# Patient Record
Sex: FEMALE | Race: WHITE | NOT HISPANIC OR LATINO
[De-identification: names, ages, dates, MRNs, and addresses within clinical notes are randomized per-mention and may not be internally consistent; named-entity substitution may affect disease eponyms.]

---

## 2023-02-14 PROBLEM — Z00.00 ENCOUNTER FOR PREVENTIVE HEALTH EXAMINATION: Status: ACTIVE | Noted: 2023-02-14

## 2023-03-21 ENCOUNTER — APPOINTMENT (OUTPATIENT)
Dept: NEPHROLOGY | Facility: CLINIC | Age: 48
End: 2023-03-21
Payer: COMMERCIAL

## 2023-03-21 VITALS
BODY MASS INDEX: 41.41 KG/M2 | TEMPERATURE: 98 F | WEIGHT: 233.69 LBS | OXYGEN SATURATION: 99 % | SYSTOLIC BLOOD PRESSURE: 116 MMHG | HEIGHT: 63 IN | DIASTOLIC BLOOD PRESSURE: 78 MMHG | HEART RATE: 88 BPM

## 2023-03-21 DIAGNOSIS — N02.8 RECURRENT AND PERSISTENT HEMATURIA WITH OTHER MORPHOLOGIC CHANGES: ICD-10-CM

## 2023-03-21 PROCEDURE — 99205 OFFICE O/P NEW HI 60 MIN: CPT

## 2023-03-21 RX ORDER — LISINOPRIL 2.5 MG/1
2.5 TABLET ORAL TWICE DAILY
Refills: 0 | Status: ACTIVE | COMMUNITY

## 2023-03-21 NOTE — HISTORY OF PRESENT ILLNESS
[FreeTextEntry1] : Consult for IgA nephropathy \par \par 46 yo very pleasant lady who was first diagnosed with IgA nephropathy 10 years ago at [facility]. She had presented with an elevated creatinine and protein in the urine. Kidney Biopsy ( actual path report not available)- showed IgA nephropathy. Per patient she was started on Valsartan 160 mg twice a day at that time. She apparently did not tolerate this dose. She started seeing a nephrologist at [facility] about 5 years ago who initiated her on Lisinopril 2.5 mg BID that she has continued to take since then. she has not seen a nephrologist since about a year ago. They live in [place], [place]. \par \par Her home BPs have been low 100s systolic \par \par never had gross blood in urine \par \par does not take NSAIDs or any other over the counter medications \par \par Lab data 11/29/2021- \par Urine protein/creatinine ratio- 1.07 gms/gm of creatinine \par Creatinine- 1.12 \par eGFR- 58 \par BUN- 21 \par \par 3/18/22- urine protein/creatinine ratio- 1.163 gms/gm of creatinine \par no hematuria \par creatinine - 1.01 \par egfr- 66 \par \par highest GFR according to her has been 77 ml/min\par \par Kidney biopsy 10 years ago at [facility] - IgA nephropathy \par was done as creatinine was 1.5 / proteinuria \par Started on Valsartan 160 BID \par Started seeing someone at [facility] 5 years ago- Lisinopril 2.5 BID \par Home BPs have been in the 100s\par \par PMH - No diabetes, no HTN \par \par Social H- denies smoking, alcohol or illicit drug use \par \par Family H- none of kidney disease \par \par ROS \par \par - No changes in urination, no blood in urine, no foamy urine \par CVS- No chest pain, no shortness of breath\par GI - no nausea/ vomiting, no changes in appetite \par all other systems reviewed in detail and were negative except as above\par

## 2023-03-21 NOTE — ASSESSMENT
[FreeTextEntry1] : 48 yo lady with IgA nephropathy and CKD \par \par 1) IgA nephropathy - it appears that she has been stable for the last 10 years. Her BP is well controlled on Lisinopril 2.5 mg BID. Last urine protein/creatinine ratio was 1 year ago and was 1.1 gms/gm of creatinine \par \par I spoke to her and her  about the importance of supportive measures including exercise, weight loss and low sodium diet. We talked at length about the current landscape of treatment for IgA nephropathy, the different options including SGLT2i and Tarpeyo. I emphasized the importance of good BP control in preventing progression of disease. I emphasized on the importance of lowering proteinuria in preventing progression of disease. \par \par will check labs today including CMP, Urine albumin/creatinine ratio. \par If her urine protein/creat ratio is above 0.5 gms/gm, will initiate SGLT2i- I explained the overwhelming benefit of SGLT2i and the possible side effects. I counselled them about the sick day rule. \par I explained that in the future, if her proteinuria is not controlled with ACEi + SGLT2i, we will try Tarpeyo- we also discussed in detail the mechanism of the drug and potential benefit. \par \par of note, she has had an immunologic workup including ABDIAS and ANCA that were negative. \par \par 2) CKD Stage G2A3- from # 1. Plan as above

## 2023-03-21 NOTE — PHYSICAL EXAM
[General Appearance - Alert] : alert [General Appearance - In No Acute Distress] : in no acute distress [General Appearance - Well Nourished] : well nourished [General Appearance - Well Developed] : well developed [General Appearance - Well-Appearing] : healthy appearing [Sclera] : the sclera and conjunctiva were normal [PERRL With Normal Accommodation] : pupils were equal in size, round, and reactive to light [Outer Ear] : the ears and nose were normal in appearance [Hearing Threshold Finger Rub Not [place]] : hearing was normal [Examination Of The Oral Cavity] : the lips and gums were normal [Neck Appearance] : the appearance of the neck was normal [Neck Cervical Mass (___cm)] : no neck mass was observed [Jugular Venous Distention Increased] : there was no jugular-venous distention [Respiration, Rhythm And Depth] : normal respiratory rhythm and effort [Exaggerated Use Of Accessory Muscles For Inspiration] : no accessory muscle use [Auscultation Breath Sounds / Voice Sounds] : lungs were clear to auscultation bilaterally [Heart Rate And Rhythm] : heart rate was normal and rhythm regular [Heart Sounds] : normal S1 and S2 [Heart Sounds Gallop] : no gallops [Murmurs] : no murmurs [Arterial Pulses Carotid] : carotid pulses were normal with no bruits [Edema] : there was no peripheral edema [Bowel Sounds] : normal bowel sounds [Abdomen Soft] : soft [Abdomen Tenderness] : non-tender [No CVA Tenderness] : no ~M costovertebral angle tenderness [No Spinal Tenderness] : no spinal tenderness [Abnormal Walk] : normal gait [Nail Clubbing] : no clubbing  or cyanosis of the fingernails [Musculoskeletal - Swelling] : no joint swelling seen [Skin Color & Pigmentation] : normal skin color and pigmentation [Skin Turgor] : normal skin turgor [] : no rash [Cranial Nerves] : cranial nerves 2-12 were intact [Deep Tendon Reflexes (DTR)] : deep tendon reflexes were 2+ and symmetric [Sensation] : the sensory exam was normal to light touch and pinprick [Motor Exam] : the motor exam was normal [Oriented To Time, Place, And Person] : oriented to person, place, and time [Impaired Insight] : insight and judgment were intact [Affect] : the affect was normal [Mood] : the mood was normal

## 2023-03-23 PROBLEM — N02.8 IGA NEPHROPATHY: Status: ACTIVE | Noted: 2023-03-21

## 2023-03-23 LAB
ALBUMIN SERPL ELPH-MCNC: 4.4 G/DL
ALP BLD-CCNC: 63 U/L
ALT SERPL-CCNC: 22 U/L
ANION GAP SERPL CALC-SCNC: 13 MMOL/L
APPEARANCE: CLEAR
AST SERPL-CCNC: 17 U/L
BACTERIA: NEGATIVE
BILIRUB SERPL-MCNC: 0.2 MG/DL
BILIRUBIN URINE: NEGATIVE
BLOOD URINE: NEGATIVE
BUN SERPL-MCNC: 22 MG/DL
CALCIUM SERPL-MCNC: 10.2 MG/DL
CHLORIDE SERPL-SCNC: 103 MMOL/L
CO2 SERPL-SCNC: 23 MMOL/L
COLOR: COLORLESS
CREAT SERPL-MCNC: 1.05 MG/DL
CREAT SPEC-SCNC: 32 MG/DL
CREAT SPEC-SCNC: 32 MG/DL
CREAT/PROT UR: 3.8 RATIO
EGFR: 65 ML/MIN/1.73M2
GLUCOSE QUALITATIVE U: NEGATIVE
GLUCOSE SERPL-MCNC: 87 MG/DL
HCT VFR BLD CALC: 44.8 %
HGB BLD-MCNC: 14.5 G/DL
HYALINE CASTS: 1 /LPF
KETONES URINE: NEGATIVE
LEUKOCYTE ESTERASE URINE: NEGATIVE
MCHC RBC-ENTMCNC: 28.9 PG
MCHC RBC-ENTMCNC: 32.4 GM/DL
MCV RBC AUTO: 89.4 FL
MICROALBUMIN 24H UR DL<=1MG/L-MCNC: 77 MG/DL
MICROALBUMIN/CREAT 24H UR-RTO: 2430 MG/G
MICROSCOPIC-UA: NORMAL
NITRITE URINE: NEGATIVE
PH URINE: 6
PLATELET # BLD AUTO: 322 K/UL
POTASSIUM SERPL-SCNC: 4.8 MMOL/L
PROT SERPL-MCNC: 7 G/DL
PROT UR-MCNC: 120 MG/DL
PROTEIN URINE: ABNORMAL
RBC # BLD: 5.01 M/UL
RBC # FLD: 13.3 %
RED BLOOD CELLS URINE: 1 /HPF
SODIUM SERPL-SCNC: 139 MMOL/L
SPECIFIC GRAVITY URINE: 1.01
SQUAMOUS EPITHELIAL CELLS: 0 /HPF
UROBILINOGEN URINE: NORMAL
WBC # FLD AUTO: 10.22 K/UL
WHITE BLOOD CELLS URINE: 0 /HPF

## 2023-07-07 ENCOUNTER — APPOINTMENT (OUTPATIENT)
Dept: NEPHROLOGY | Facility: CLINIC | Age: 48
End: 2023-07-07